# Patient Record
Sex: FEMALE | Race: WHITE | NOT HISPANIC OR LATINO | Employment: OTHER | URBAN - METROPOLITAN AREA
[De-identification: names, ages, dates, MRNs, and addresses within clinical notes are randomized per-mention and may not be internally consistent; named-entity substitution may affect disease eponyms.]

---

## 2023-05-31 ENCOUNTER — PATIENT MESSAGE (OUTPATIENT)
Dept: RESEARCH | Facility: HOSPITAL | Age: 68
End: 2023-05-31

## 2024-06-03 DIAGNOSIS — R39.15 URGENCY OF URINATION: Primary | ICD-10-CM

## 2024-07-16 ENCOUNTER — OFFICE VISIT (OUTPATIENT)
Dept: UROLOGY | Facility: CLINIC | Age: 69
End: 2024-07-16
Payer: MEDICARE

## 2024-07-16 VITALS
HEART RATE: 53 BPM | BODY MASS INDEX: 30.1 KG/M2 | TEMPERATURE: 98 F | SYSTOLIC BLOOD PRESSURE: 111 MMHG | DIASTOLIC BLOOD PRESSURE: 71 MMHG | RESPIRATION RATE: 16 BRPM | WEIGHT: 191.81 LBS | HEIGHT: 67 IN

## 2024-07-16 DIAGNOSIS — N39.46 MIXED INCONTINENCE: ICD-10-CM

## 2024-07-16 DIAGNOSIS — R31.29 MICROHEMATURIA: ICD-10-CM

## 2024-07-16 DIAGNOSIS — R39.15 URGENCY OF URINATION: Primary | ICD-10-CM

## 2024-07-16 DIAGNOSIS — N81.6 RECTOCELE: ICD-10-CM

## 2024-07-16 LAB
BACTERIA #/AREA URNS AUTO: ABNORMAL /HPF
BILIRUB UR QL STRIP: NEGATIVE
GLUCOSE UR QL STRIP: NEGATIVE
KETONES UR QL STRIP: NEGATIVE
LEUKOCYTE ESTERASE UR QL STRIP: POSITIVE
MICROSCOPIC COMMENT: ABNORMAL
PH, POC UA: 6.5
POC BLOOD, URINE: POSITIVE
POC NITRATES, URINE: NEGATIVE
POC RESIDUAL URINE VOLUME: 22 ML (ref 0–100)
PROT UR QL STRIP: NEGATIVE
RBC #/AREA URNS AUTO: 3 /HPF (ref 0–4)
SP GR UR STRIP: 1.02 (ref 1–1.03)
SQUAMOUS #/AREA URNS AUTO: 3 /HPF
UROBILINOGEN UR STRIP-ACNC: 0.2 (ref 0.1–1.1)
WBC #/AREA URNS AUTO: 7 /HPF (ref 0–5)

## 2024-07-16 PROCEDURE — 81001 URINALYSIS AUTO W/SCOPE: CPT | Performed by: UROLOGY

## 2024-07-16 PROCEDURE — 99999 PR PBB SHADOW E&M-EST. PATIENT-LVL IV: CPT | Mod: PBBFAC,,, | Performed by: UROLOGY

## 2024-07-16 PROCEDURE — 87086 URINE CULTURE/COLONY COUNT: CPT | Performed by: UROLOGY

## 2024-07-16 RX ORDER — MIRABEGRON 25 MG/1
25 TABLET, FILM COATED, EXTENDED RELEASE ORAL EVERY MORNING
COMMUNITY
End: 2024-07-16 | Stop reason: SDUPTHER

## 2024-07-16 RX ORDER — MIRABEGRON 25 MG/1
25 TABLET, FILM COATED, EXTENDED RELEASE ORAL EVERY MORNING
Qty: 30 TABLET | Refills: 12 | Status: SHIPPED | OUTPATIENT
Start: 2024-07-16

## 2024-07-16 NOTE — PROGRESS NOTES
Chief Complaint   Patient presents with    Urinary Urgency       Referring Provider: Dr. Yocasta Gibson      History of Present Illness:   Samantha Kelley is a 69 y.o. female here for evaluation of Urinary Urgency    [birth year]-70yo female, here for evaluation of urinary urgency and rectocele. Pt reports that she has been seen by Dr. Rosales and she had a cysto. She was scheduled to have a bladder lift and hysterectomy but she has cancelled it. She states that she wasn't having an issue with her uterus, so she was unsure why she needed a hysterectomy. She reports that she has a rectocele and she has difficulty with Bms, but no splinting. She reports that about 2 years ago she developed urinary urgency and was wearing pads for UUI. Her PCP recently put her on Myrbetriq 25mg and she has been doing great with myrbetriq and no longer having to wear pads. Only has JUAN J if she has a URI and is coughing hard or if she jumps on the trampoline. She knows of kegel exercises, but doesn't really do them.       Review of Systems   Constitutional:  Negative for chills and fever.   Respiratory:  Negative for shortness of breath.    Cardiovascular:  Negative for chest pain.   Gastrointestinal:  Negative for abdominal pain.   Genitourinary:  Negative for hematuria.   All other systems reviewed and are negative.        Past Medical History:   Diagnosis Date    Depression     Esophageal reflux     Hyperlipidemia     Hypertension     Hypothyroidism     Menopausal syndrome     Sleep disturbance        Past Surgical History:   Procedure Laterality Date    ABDOMINOPLASTY      CATARACT EXTRACTION      GASTRIC BYPASS      REDUCTION OF BOTH BREASTS Bilateral 1988    TOTAL REDUCTION MAMMOPLASTY         Family History   Problem Relation Name Age of Onset    Heart disease Mother      Heart disease Maternal Grandfather      Breast cancer Maternal Aunt         Social History     Tobacco Use    Smoking status: Never    Smokeless tobacco: Never   Substance  Use Topics    Alcohol use: Yes    Drug use: Never       Current Outpatient Medications   Medication Sig Dispense Refill    amlodipine-olmesartan (SAMY) 5-40 mg per tablet Take 1 tablet by mouth once daily.      cyanocobalamin (VITAMIN B-12) 1000 MCG tablet Take 1,000 mcg by mouth.      levothyroxine (SYNTHROID) 50 MCG tablet       meloxicam (MOBIC) 15 MG tablet TAKE 1 TABLET BY MOUTH EVERY DAY WITH MEALS      OZEMPIC 1 mg/dose (4 mg/3 mL) INJECT 1 MG SUBCUTANEOUSLY ONCE A WEEK IN THE EVENING      ramelteon (ROZEREM) 8 mg tablet       rosuvastatin (CRESTOR) 10 MG tablet Take 10 mg by mouth every evening.      valACYclovir (VALTREX) 1000 MG tablet       vitamin D (VITAMIN D3) 1000 units Tab Take 1,000 Units by mouth.      MYRBETRIQ 25 mg Tb24 ER tablet Take 1 tablet (25 mg total) by mouth every morning. 30 tablet 12     No current facility-administered medications for this visit.       Review of patient's allergies indicates:   Allergen Reactions    Sulfa (sulfonamide antibiotics) Hives and Shortness Of Breath    Ciprofloxacin Rash       Physical Exam  Vitals:    07/16/24 0925   BP: 111/71   Pulse: (!) 53   Resp: 16   Temp: 98 °F (36.7 °C)     General: Well-developed, well-nourished, in no acute distress  HEENT: Normocephalic, atraumatic, extraocular movements intact  Neck: Supple, no supraclavicular or cervical lymphadenopathy, trachea midline  Respirations: even and unlabored  Back: midline spine, No CVA tenderness  Abdomen: soft, Non-tender, non-distended, no palpable masses, no rebound or guarding  : Normal external female genitalia without lesions. Orthotopic urethral meatus. Mildly atrophic vaginal mucosa. No significant Cysotcele, Grade 2 Rectocele, Grade no significant uterine prolapse, negative cough stress test, + urethral hypermobility  Extremities: moves all equally, no clubbing, cyanosis or edema  Skin: Warm and dry. No lesions  Psych: normal affect  Neuro: Alert and oriented x 3. Cranial nerves II-XII  intact    PVR: 22mL    Urinalysis  Trace blood, small LE      Assessment:  1. Urgency of urination  Ambulatory referral/consult to Urology    POCT Urinalysis, Dipstick, Automated, W/O Scope    POCT Bladder Scan      2. Microhematuria  Urinalysis Microscopic    Urine culture      3. Rectocele        4. Mixed incontinence              Plan:   Urgency of urination  -     Ambulatory referral/consult to Urology  -     POCT Urinalysis, Dipstick, Automated, W/O Scope  -     POCT Bladder Scan    Microhematuria  -     Urinalysis Microscopic  -     Urine culture    Rectocele    Mixed incontinence    Other orders  -     MYRBETRIQ 25 mg Tb24 ER tablet; Take 1 tablet (25 mg total) by mouth every morning.  Dispense: 30 tablet; Refill: 12      Discussed jin, pt to start this for mild JUAN J  Discussed rectocele repair and post-operative expectations. Pt would like to monitor her diet and treat conservatively for now.     Follow up if symptoms worsen or fail to improve.

## 2024-07-18 LAB
BACTERIA UR CULT: NORMAL
BACTERIA UR CULT: NORMAL

## 2024-07-23 ENCOUNTER — TELEPHONE (OUTPATIENT)
Dept: UROLOGY | Facility: CLINIC | Age: 69
End: 2024-07-23
Payer: MEDICARE

## 2024-07-23 NOTE — TELEPHONE ENCOUNTER
.Outgoing call placed to patient, patient verified name and date of birth, patient stated that she did a cystoscopy several months back and also imaging. She was notified of below per Dr. Oliver and verbalized understanding. She stated that she will contact  office and have them fax her records to us. She was provided the fax number to have them send it to. No further assistance needed at this time.       ----- Message from Zee Oliver MD sent at 7/22/2024 11:20 AM CDT -----  Please notify pt that she has slightly more blood in the urine than what is considered normal. I believe she recently had a cysto with Dr. Rosales. He may have done a renal US or CT scan also. Please check with pt to find out if he did either an US or CT scan to look into this. If so, we don't need to do any further workup. Please request records on this if he did those tests.